# Patient Record
Sex: MALE | Race: WHITE | NOT HISPANIC OR LATINO | Employment: FULL TIME | URBAN - NONMETROPOLITAN AREA
[De-identification: names, ages, dates, MRNs, and addresses within clinical notes are randomized per-mention and may not be internally consistent; named-entity substitution may affect disease eponyms.]

---

## 2020-05-06 ENCOUNTER — OFFICE VISIT (OUTPATIENT)
Dept: URGENT CARE | Facility: PHYSICIAN GROUP | Age: 30
End: 2020-05-06
Payer: COMMERCIAL

## 2020-05-06 VITALS
OXYGEN SATURATION: 98 % | RESPIRATION RATE: 16 BRPM | HEART RATE: 90 BPM | WEIGHT: 225 LBS | HEIGHT: 71 IN | BODY MASS INDEX: 31.5 KG/M2 | DIASTOLIC BLOOD PRESSURE: 82 MMHG | SYSTOLIC BLOOD PRESSURE: 126 MMHG | TEMPERATURE: 99 F

## 2020-05-06 DIAGNOSIS — M79.89 ARM SWELLING: ICD-10-CM

## 2020-05-06 DIAGNOSIS — L55.1 SECOND DEGREE SUNBURN: ICD-10-CM

## 2020-05-06 PROCEDURE — 99204 OFFICE O/P NEW MOD 45 MIN: CPT | Performed by: PHYSICIAN ASSISTANT

## 2020-05-06 RX ORDER — PREDNISONE 10 MG/1
TABLET ORAL
Qty: 1 QUANTITY SUFFICIENT | Refills: 0 | Status: SHIPPED | OUTPATIENT
Start: 2020-05-06

## 2020-05-06 RX ORDER — CEPHALEXIN 500 MG/1
500 CAPSULE ORAL 4 TIMES DAILY
Qty: 20 CAP | Refills: 0 | Status: SHIPPED | OUTPATIENT
Start: 2020-05-06 | End: 2020-05-11

## 2020-05-06 SDOH — HEALTH STABILITY: MENTAL HEALTH: HOW OFTEN DO YOU HAVE A DRINK CONTAINING ALCOHOL?: 2-4 TIMES A MONTH

## 2020-05-06 NOTE — LETTER
May 6, 2020         Patient: Cristiano Cooley   YOB: 1990   Date of Visit: 5/6/2020           To Whom it May Concern:    Cristiano Cooley was seen in my clinic on 5/6/2020. He may return to work 5/11/2020.    If you have any questions or concerns, please don't hesitate to call.        Sincerely,           Margot Melissa P.A.-C.  Electronically Signed

## 2020-05-06 NOTE — PROGRESS NOTES
"Chief Complaint   Patient presents with   • Sunburn     severe sunburn on shoulders(2nd degree) and on arms-very swollen        HISTORY OF PRESENT ILLNESS: Patient is a 29 y.o. male who presents today for the following:    Patient comes in for evaluation sunburn that started 4 days ago.  He was outside most of the day without a shirt on and did not apply sunscreen.  He is concerned because his arms have been swelling.  He has had blistering of the skin.  He has not taken any over-the-counter medication.    There are no active problems to display for this patient.      Allergies:Patient has no known allergies.    Current Outpatient Medications Ordered in Epic   Medication Sig Dispense Refill   • predniSONE (DELTASONE) 10 MG Tab 50 mg x 1 day; 40 mg x 1 day; 30 mg x 1 day; 20 mg x 1 day; 10 mg x 1 day 1 Quantity Sufficient 0   • cephALEXin (KEFLEX) 500 MG Cap Take 1 Cap by mouth 4 times a day for 5 days. 20 Cap 0     No current Epic-ordered facility-administered medications on file.        History reviewed. No pertinent past medical history.    Social History     Tobacco Use   • Smoking status: Never Smoker   • Smokeless tobacco: Never Used   Substance Use Topics   • Alcohol use: Yes     Frequency: 2-4 times a month   • Drug use: Yes       No family status information on file.   History reviewed. No pertinent family history.    Review of Systems:   Constitutional ROS: No unexpected change in weight, No weakness, No fatigue  Pulmonary ROS: No chronic cough, sputum, or hemoptysis, No dyspnea on exertion, No wheezing  Cardiovascular ROS: No diaphoresis, No edema, No palpitations  Musculoskeletal/Extremities ROS: Bilateral arm swelling.  Hematologic/Lymphatic ROS: No chills, No night sweats, No weight loss  Skin/Integumentary ROS: Sunburn.      Exam:  /82   Pulse 90   Temp 37.2 °C (99 °F)   Resp 16   Ht 1.803 m (5' 11\")   Wt 102.1 kg (225 lb)   SpO2 98%   General: Well developed, well nourished. No distress.  "   HENT: Head is grossly normal.  Pulmonary: Unlabored respiratory effort.   Neurologic: Grossly nonfocal. No facial asymmetry noted.  Musculoskeletal: Diffuse edema noted of both arms from the mid forearm extending to the shoulders.  No pitting edema noted.  Skin: Diffuse erythema noted from the waist up with scattered blistering filled with clear yellow fluid.  No open skin is noted.  Psych: Normal mood. Alert and oriented to person, place and time.    Assessment/Plan:  Suspect patient's arm swelling is secondary to the sunburn.  Have a low suspicion for a secondary bacterial infection at this time but will provide contingent antibiotics.  Discussed wound care at home.. Follow up for worsening or persistent symptoms.  1. Second degree sunburn  predniSONE (DELTASONE) 10 MG Tab    cephALEXin (KEFLEX) 500 MG Cap   2. Arm swelling  predniSONE (DELTASONE) 10 MG Tab    cephALEXin (KEFLEX) 500 MG Cap